# Patient Record
Sex: FEMALE | Race: WHITE
[De-identification: names, ages, dates, MRNs, and addresses within clinical notes are randomized per-mention and may not be internally consistent; named-entity substitution may affect disease eponyms.]

---

## 2018-06-02 ENCOUNTER — HOSPITAL ENCOUNTER (EMERGENCY)
Dept: HOSPITAL 25 - UCCORT | Age: 23
Discharge: HOME | End: 2018-06-02
Payer: COMMERCIAL

## 2018-06-02 VITALS — SYSTOLIC BLOOD PRESSURE: 109 MMHG | DIASTOLIC BLOOD PRESSURE: 64 MMHG

## 2018-06-02 DIAGNOSIS — Z88.0: ICD-10-CM

## 2018-06-02 DIAGNOSIS — N91.2: ICD-10-CM

## 2018-06-02 DIAGNOSIS — J32.9: Primary | ICD-10-CM

## 2018-06-02 PROCEDURE — G0463 HOSPITAL OUTPT CLINIC VISIT: HCPCS

## 2018-06-02 PROCEDURE — 99202 OFFICE O/P NEW SF 15 MIN: CPT

## 2018-06-02 NOTE — UC
UC General HPI





- HPI Summary


HPI Summary: 





pt c/o a 2 weeks hx sinsu congestion. now have pressure and yellow green 

drainage. used a decongestant with no relief. denies allergies.





- History of Current Complaint


Chief Complaint: UCRespiratory


Stated Complaint: POSSIBLE SINUS INFECTION


Time Seen by Provider: 06/02/18 19:19


Hx Obtained From: Patient


Hx Last Menstrual Period: HAS NOT HAD A PERIOD IN 3 YEARS, SEEING A GYN


Onset/Duration: Gradual Onset


Timing: Constant


Pain Intensity: 6


Aggravating: nothing


Alleviating: nothing


Associated Signs & Symptoms: Positive: Fever





- Allergy/Home Medications


Allergies/Adverse Reactions: 


 Allergies











Allergy/AdvReac Type Severity Reaction Status Date / Time


 


Penicillins Allergy Unknown Unknown Verified 06/02/18 18:51





   Reaction  





   Details  











Home Medications: 


 Home Medications





D-Methorphan/PE/Acetaminophen [Cold Multi-Symptom Gelcap] 1 each PO PRN 06/02/ 18 [History]











PMH/Surg Hx/FS Hx/Imm Hx





- Additional Past Medical History


Additional PMH: 





amenorrhea x 3 years, sinusitis





- Surgical History


Surgical History: None





- Family History


Known Family History: Positive: None





- Social History


Occupation: Employed Full-time


Lives: With Family


Alcohol Use: Occasionally


Substance Use Type: None


Smoking Status (MU): Never Smoked Tobacco





- Immunization History


Vaccination Up to Date: Yes





Review of Systems


Constitutional: Fever, Chills


Skin: Negative


Eyes: Negative


ENT: Nasal Discharge, Sinus Congestion, Sinus Pain/Tenderness


Respiratory: Negative


Cardiovascular: Negative


Gastrointestinal: Negative


Genitourinary: Negative


Motor: Negative


Neurovascular: Negative


Musculoskeletal: Negative


Neurological: Negative


Psychological: Negative


Is Patient Immunocompromised?: No


All Other Systems Reviewed And Are Negative: Yes





Physical Exam


Triage Information Reviewed: Yes


Appearance: Well-Appearing


Vital Signs: 


 Initial Vital Signs











Temp  99.4 F   06/02/18 18:52


 


Pulse  93   06/02/18 18:52


 


Resp  16   06/02/18 18:52


 


BP  109/64   06/02/18 18:52


 


Pulse Ox  100   06/02/18 18:52











Eyes: Positive: Conjunctiva Clear


ENT: Positive: Pharynx normal, Nasal drainage - yellow, TMs normal, Sinus 

tenderness


Neck: Positive: Supple, Nontender, No Lymphadenopathy


Respiratory: Positive: Lungs clear, Normal breath sounds


Cardiovascular: Positive: RRR, No Murmur


Abdomen Description: Positive: Nontender, No Organomegaly, Soft


Bowel Sounds: Positive: Present


Musculoskeletal: Positive: ROM Intact


Neurological: Positive: Alert


Psychological: Positive: Age Appropriate Behavior


Skin Exam: Normal





Course/Dx





- Differential Dx - Multi-Symptom


Provider Diagnoses: sinusitis





Discharge





- Sign-Out/Discharge


Documenting (check all that apply): Discharge/Admit/Transfer





- Discharge Plan


Condition: Stable


Disposition: HOME


Prescriptions: 


DOXYcycline CAP(*) [DOXYcycline 100MG CAP(*)] 100 mg PO BID #20 cap


Patient Education Materials:  Sinusitis (ED)


Referrals: 


[facility] PHYSICIAN REFERRAL [Outside]


Additional Instructions: 


call the physician referral monday, they will assist with finding a primary 

care doctor.


recheck in 7-10 days.





- Billing Disposition and Condition


Condition: STABLE


Disposition: HOME